# Patient Record
Sex: MALE | Race: WHITE | URBAN - METROPOLITAN AREA
[De-identification: names, ages, dates, MRNs, and addresses within clinical notes are randomized per-mention and may not be internally consistent; named-entity substitution may affect disease eponyms.]

---

## 2021-01-12 ENCOUNTER — OFFICE VISIT (OUTPATIENT)
Dept: FAMILY MEDICINE CLINIC | Facility: CLINIC | Age: 51
End: 2021-01-12
Payer: COMMERCIAL

## 2021-01-12 VITALS
HEIGHT: 73 IN | TEMPERATURE: 97 F | DIASTOLIC BLOOD PRESSURE: 70 MMHG | RESPIRATION RATE: 22 BRPM | WEIGHT: 225.25 LBS | SYSTOLIC BLOOD PRESSURE: 106 MMHG | HEART RATE: 80 BPM | BODY MASS INDEX: 29.85 KG/M2

## 2021-01-12 DIAGNOSIS — R06.6 INTRACTABLE HICCUPS: ICD-10-CM

## 2021-01-12 DIAGNOSIS — E11.9 TYPE 2 DIABETES MELLITUS WITHOUT COMPLICATION, WITHOUT LONG-TERM CURRENT USE OF INSULIN (HCC): ICD-10-CM

## 2021-01-12 DIAGNOSIS — N39.0 URINARY TRACT INFECTION WITHOUT HEMATURIA, SITE UNSPECIFIED: Primary | ICD-10-CM

## 2021-01-12 PROBLEM — E11.65 TYPE 2 DIABETES MELLITUS WITH HYPERGLYCEMIA, WITHOUT LONG-TERM CURRENT USE OF INSULIN (HCC): Status: ACTIVE | Noted: 2021-01-12

## 2021-01-12 PROBLEM — Z85.6 HISTORY OF LEUKEMIA: Status: ACTIVE | Noted: 2021-01-12

## 2021-01-12 PROCEDURE — 3074F SYST BP LT 130 MM HG: CPT | Performed by: NURSE PRACTITIONER

## 2021-01-12 PROCEDURE — 3008F BODY MASS INDEX DOCD: CPT | Performed by: NURSE PRACTITIONER

## 2021-01-12 PROCEDURE — 3078F DIAST BP <80 MM HG: CPT | Performed by: NURSE PRACTITIONER

## 2021-01-12 PROCEDURE — 99204 OFFICE O/P NEW MOD 45 MIN: CPT | Performed by: NURSE PRACTITIONER

## 2021-01-12 RX ORDER — CIPROFLOXACIN 500 MG/1
500 TABLET, FILM COATED ORAL 2 TIMES DAILY
Qty: 14 TABLET | Refills: 0 | Status: SHIPPED | OUTPATIENT
Start: 2021-01-12

## 2021-01-12 RX ORDER — SULFAMETHOXAZOLE AND TRIMETHOPRIM 800; 160 MG/1; MG/1
1 TABLET ORAL EVERY 12 HOURS
COMMUNITY
Start: 2021-01-05 | End: 2021-01-12

## 2021-01-12 RX ORDER — NICOTINE POLACRILEX 4 MG/1
1 GUM, CHEWING ORAL DAILY
Qty: 30 TABLET | Refills: 0 | Status: SHIPPED | OUTPATIENT
Start: 2021-01-12 | End: 2021-02-11

## 2021-01-12 NOTE — PROGRESS NOTES
Jemima Berrios is a 44year old male who presents as a new patient to establish. HPI:   Pt presents with several complaints. [facility] with complaints of dysuria on 1/5/2021.  While there they note significant glucose Medications   Medication Sig Dispense Refill   • metFORMIN HCl 500 MG Oral Tab Take 500 mg by mouth 2 (two) times daily. • Ciprofloxacin HCl 500 MG Oral Tab Take 1 tablet (500 mg total) by mouth 2 (two) times daily.  14 tablet 0   • Omeprazole 20 MG Ora constant hiccups in exam room. MUSCULOSKELETAL: back is not tender, ROM WNL. EXTREMITIES: no edema, clubbing or cyanosis. ROM to extremities WNL. NEURO: Oriented times three, motor/sensory function intact. Follows commands appropriately.     ASSESSMENT Follow bland diet for now. See me in one week.

## 2021-01-12 NOTE — PATIENT INSTRUCTIONS
Stop antibiotic from Immediate care. Start Ciprofloxacin, one tab twice daily. Start omeprazole, one tab, first thing in the morning on an empty stomach. Follow bland diet for now. See me in one week.

## 2021-01-13 ENCOUNTER — NURSE ONLY (OUTPATIENT)
Dept: ENDOCRINOLOGY CLINIC | Facility: CLINIC | Age: 51
End: 2021-01-13
Payer: COMMERCIAL

## 2021-01-13 ENCOUNTER — TELEPHONE (OUTPATIENT)
Dept: ENDOCRINOLOGY CLINIC | Facility: CLINIC | Age: 51
End: 2021-01-13

## 2021-01-13 DIAGNOSIS — E11.9 TYPE 2 DIABETES MELLITUS WITHOUT COMPLICATION, WITHOUT LONG-TERM CURRENT USE OF INSULIN (HCC): ICD-10-CM

## 2021-01-13 DIAGNOSIS — E11.65 TYPE 2 DIABETES MELLITUS WITH HYPERGLYCEMIA, WITHOUT LONG-TERM CURRENT USE OF INSULIN (HCC): Primary | ICD-10-CM

## 2021-01-13 PROCEDURE — G0108 DIAB MANAGE TRN  PER INDIV: HCPCS | Performed by: DIETITIAN, REGISTERED

## 2021-01-13 RX ORDER — BLOOD-GLUCOSE METER
EACH MISCELLANEOUS
Qty: 1 KIT | Refills: 0 | Status: SHIPPED | OUTPATIENT
Start: 2021-01-13

## 2021-01-13 RX ORDER — LANCETS 30 GAUGE
1 EACH MISCELLANEOUS 3 TIMES DAILY
Qty: 300 EACH | Refills: 3 | Status: SHIPPED | OUTPATIENT
Start: 2021-01-13

## 2021-01-13 RX ORDER — BLOOD SUGAR DIAGNOSTIC
STRIP MISCELLANEOUS
Qty: 300 STRIP | Refills: 3 | Status: SHIPPED | OUTPATIENT
Start: 2021-01-13

## 2021-01-13 NOTE — PROGRESS NOTES
Alberto Fam  : 12/10/1970 attended [facility] Diabetic Education:    Date: 2021  Referring Provider: Awais Eddy NP  Start time: 9am End time: 10:00am      No results found for: A1C   Telehealth outside of [facility] Consent   A tele daily  Action/Side Effects/Interactions/Precautions/Missed Doses: Decreases glucose production by the liver. May cause G.I. Side effectsif not taken with food. If dose missed, wait until next meal. Hold meds if tests ordered requiring contrast media.     Oren Guzman

## 2021-01-13 NOTE — TELEPHONE ENCOUNTER
Called pt. insurance to find out covered meter; [employer] & supplies sent to pharmacy.

## 2021-01-20 ENCOUNTER — TELEPHONE (OUTPATIENT)
Dept: FAMILY MEDICINE CLINIC | Facility: CLINIC | Age: 51
End: 2021-01-20

## 2022-03-01 ENCOUNTER — PATIENT OUTREACH (OUTPATIENT)
Dept: FAMILY MEDICINE CLINIC | Facility: CLINIC | Age: 52
End: 2022-03-01

## 2022-03-01 NOTE — PROGRESS NOTES
LOV 1/11/22 was acute and also to establish care. Pt was advised to follow up in one week to discuss diabetes. Pt was a no show on 1/20/22. No future appointments. Please call pt and assist him in scheduling diabetic follow up appt. Routed to front staff.

## 2022-03-02 NOTE — PROGRESS NOTES
Patient said he is not our patient any longer he has a new PCP and all better now, he ended up in the hospital for 13 days. He is all better now and everything is under control. Please remove Dr. Asia Acevedo as PCP. no lesions,  no deformities,  no traumatic injuries,  no significant scars are present,  chest wall non-tender,  no masses present,  tactile fremitus is normal, breathing is unlabored without accessory muscle use,normal breath sounds